# Patient Record
Sex: MALE | Race: BLACK OR AFRICAN AMERICAN | NOT HISPANIC OR LATINO | URBAN - METROPOLITAN AREA
[De-identification: names, ages, dates, MRNs, and addresses within clinical notes are randomized per-mention and may not be internally consistent; named-entity substitution may affect disease eponyms.]

---

## 2021-12-28 ENCOUNTER — HOSPITAL ENCOUNTER (INPATIENT)
Facility: OTHER | Age: 46
LOS: 1 days | Discharge: LEFT BEFORE DISCHARGE | DRG: 177 | End: 2021-12-28
Attending: EMERGENCY MEDICINE | Admitting: HOSPITALIST
Payer: OTHER GOVERNMENT

## 2021-12-28 VITALS
BODY MASS INDEX: 37.03 KG/M2 | WEIGHT: 250 LBS | DIASTOLIC BLOOD PRESSURE: 80 MMHG | OXYGEN SATURATION: 95 % | TEMPERATURE: 98 F | SYSTOLIC BLOOD PRESSURE: 140 MMHG | RESPIRATION RATE: 21 BRPM | HEIGHT: 69 IN | HEART RATE: 107 BPM

## 2021-12-28 DIAGNOSIS — J12.82 PNEUMONIA DUE TO COVID-19 VIRUS: ICD-10-CM

## 2021-12-28 DIAGNOSIS — R07.9 CHEST PAIN: ICD-10-CM

## 2021-12-28 DIAGNOSIS — U07.1 COVID-19 VIRUS DETECTED: ICD-10-CM

## 2021-12-28 DIAGNOSIS — I10 HYPERTENSION, UNSPECIFIED TYPE: ICD-10-CM

## 2021-12-28 DIAGNOSIS — U07.1 PNEUMONIA DUE TO COVID-19 VIRUS: ICD-10-CM

## 2021-12-28 DIAGNOSIS — U07.1 COVID-19: Primary | ICD-10-CM

## 2021-12-28 DIAGNOSIS — D57.00 SICKLE CELL CRISIS: ICD-10-CM

## 2021-12-28 PROBLEM — Z76.5 MALINGERING: Status: ACTIVE | Noted: 2021-12-28

## 2021-12-28 PROBLEM — I26.99 ACUTE PULMONARY EMBOLISM: Status: ACTIVE | Noted: 2021-12-28

## 2021-12-28 PROBLEM — I25.10 PRESENCE OF STENT IN CORONARY ARTERY IN PATIENT WITH CORONARY ARTERY DISEASE: Status: ACTIVE | Noted: 2021-12-28

## 2021-12-28 PROBLEM — Z79.01 ANTICOAGULANT LONG-TERM USE: Status: ACTIVE | Noted: 2021-12-28

## 2021-12-28 PROBLEM — Z95.5 PRESENCE OF STENT IN CORONARY ARTERY IN PATIENT WITH CORONARY ARTERY DISEASE: Status: ACTIVE | Noted: 2021-12-28

## 2021-12-28 PROBLEM — F19.10 SUBSTANCE ABUSE: Status: ACTIVE | Noted: 2021-12-28

## 2021-12-28 PROBLEM — Z86.711 HISTORY OF PULMONARY EMBOLISM: Status: ACTIVE | Noted: 2021-12-28

## 2021-12-28 PROBLEM — D57.1 SICKLE CELL ANEMIA: Status: ACTIVE | Noted: 2021-12-28

## 2021-12-28 LAB
ALBUMIN SERPL BCP-MCNC: 3.6 G/DL (ref 3.5–5.2)
ALLENS TEST: ABNORMAL
ALP SERPL-CCNC: 84 U/L (ref 55–135)
ALT SERPL W/O P-5'-P-CCNC: 26 U/L (ref 10–44)
ANION GAP SERPL CALC-SCNC: 15 MMOL/L (ref 8–16)
AST SERPL-CCNC: 61 U/L (ref 10–40)
BASOPHILS # BLD AUTO: 0.01 K/UL (ref 0–0.2)
BASOPHILS NFR BLD: 0.1 % (ref 0–1.9)
BILIRUB SERPL-MCNC: 0.5 MG/DL (ref 0.1–1)
BNP SERPL-MCNC: <10 PG/ML (ref 0–99)
BUN SERPL-MCNC: 11 MG/DL (ref 6–20)
CALCIUM SERPL-MCNC: 8.6 MG/DL (ref 8.7–10.5)
CHLORIDE SERPL-SCNC: 102 MMOL/L (ref 95–110)
CK SERPL-CCNC: 652 U/L (ref 20–200)
CO2 SERPL-SCNC: 20 MMOL/L (ref 23–29)
CREAT SERPL-MCNC: 1.4 MG/DL (ref 0.5–1.4)
CRP SERPL-MCNC: 30.6 MG/L (ref 0–8.2)
CTP QC/QA: YES
D DIMER PPP IA.FEU-MCNC: 0.4 MG/L FEU
DIFFERENTIAL METHOD: ABNORMAL
EOSINOPHIL # BLD AUTO: 0 K/UL (ref 0–0.5)
EOSINOPHIL NFR BLD: 0.1 % (ref 0–8)
ERYTHROCYTE [DISTWIDTH] IN BLOOD BY AUTOMATED COUNT: 14.7 % (ref 11.5–14.5)
ERYTHROCYTE [SEDIMENTATION RATE] IN BLOOD: 33 MM/HR (ref 0–10)
EST. GFR  (AFRICAN AMERICAN): >60 ML/MIN/1.73 M^2
EST. GFR  (NON AFRICAN AMERICAN): 59 ML/MIN/1.73 M^2
FERRITIN SERPL-MCNC: 132 NG/ML (ref 20–300)
GLUCOSE SERPL-MCNC: 145 MG/DL (ref 70–110)
HCO3 UR-SCNC: 27 MMOL/L (ref 24–28)
HCT VFR BLD AUTO: 42.7 % (ref 40–54)
HCT VFR BLD CALC: 41 %PCV (ref 36–54)
HGB BLD-MCNC: 13.9 G/DL (ref 14–18)
HGB BLD-MCNC: 14 G/DL
IMM GRANULOCYTES # BLD AUTO: 0.07 K/UL (ref 0–0.04)
IMM GRANULOCYTES NFR BLD AUTO: 1 % (ref 0–0.5)
INR PPP: 0.9 (ref 0.8–1.2)
LACTATE SERPL-SCNC: 1.6 MMOL/L (ref 0.5–2.2)
LDH SERPL L TO P-CCNC: 458 U/L (ref 110–260)
LYMPHOCYTES # BLD AUTO: 1.6 K/UL (ref 1–4.8)
LYMPHOCYTES NFR BLD: 24.3 % (ref 18–48)
MCH RBC QN AUTO: 28 PG (ref 27–31)
MCHC RBC AUTO-ENTMCNC: 32.6 G/DL (ref 32–36)
MCV RBC AUTO: 86 FL (ref 82–98)
MONOCYTES # BLD AUTO: 0.5 K/UL (ref 0.3–1)
MONOCYTES NFR BLD: 7.1 % (ref 4–15)
NEUTROPHILS # BLD AUTO: 4.5 K/UL (ref 1.8–7.7)
NEUTROPHILS NFR BLD: 67.4 % (ref 38–73)
NRBC BLD-RTO: 0 /100 WBC
PCO2 BLDA: 44.7 MMHG (ref 35–45)
PH SMN: 7.39 [PH] (ref 7.35–7.45)
PLATELET # BLD AUTO: 236 K/UL (ref 150–450)
PMV BLD AUTO: 10.6 FL (ref 9.2–12.9)
PO2 BLDA: 34 MMHG (ref 40–60)
POC BE: 2 MMOL/L
POC IONIZED CALCIUM: 1.12 MMOL/L (ref 1.06–1.42)
POC SATURATED O2: 64 % (ref 95–100)
POC TCO2: 28 MMOL/L (ref 24–29)
POTASSIUM BLD-SCNC: 4 MMOL/L (ref 3.5–5.1)
POTASSIUM SERPL-SCNC: 6.1 MMOL/L (ref 3.5–5.1)
PROCALCITONIN SERPL IA-MCNC: 0.04 NG/ML
PROT SERPL-MCNC: 8.9 G/DL (ref 6–8.4)
PROTHROMBIN TIME: 10.1 SEC (ref 9–12.5)
RBC # BLD AUTO: 4.97 M/UL (ref 4.6–6.2)
RETICS/RBC NFR AUTO: 1.3 % (ref 0.4–2)
SAMPLE: ABNORMAL
SARS-COV-2 RDRP RESP QL NAA+PROBE: POSITIVE
SITE: ABNORMAL
SODIUM BLD-SCNC: 141 MMOL/L (ref 136–145)
SODIUM SERPL-SCNC: 137 MMOL/L (ref 136–145)
TROPONIN I SERPL DL<=0.01 NG/ML-MCNC: 0.01 NG/ML (ref 0–0.03)
WBC # BLD AUTO: 6.72 K/UL (ref 3.9–12.7)

## 2021-12-28 PROCEDURE — 85651 RBC SED RATE NONAUTOMATED: CPT | Performed by: EMERGENCY MEDICINE

## 2021-12-28 PROCEDURE — 82550 ASSAY OF CK (CPK): CPT | Performed by: EMERGENCY MEDICINE

## 2021-12-28 PROCEDURE — 63600175 PHARM REV CODE 636 W HCPCS: Performed by: NURSE PRACTITIONER

## 2021-12-28 PROCEDURE — 93010 ELECTROCARDIOGRAM REPORT: CPT | Mod: ,,, | Performed by: INTERNAL MEDICINE

## 2021-12-28 PROCEDURE — C9399 UNCLASSIFIED DRUGS OR BIOLOG: HCPCS | Performed by: HOSPITALIST

## 2021-12-28 PROCEDURE — 25000003 PHARM REV CODE 250: Performed by: NURSE PRACTITIONER

## 2021-12-28 PROCEDURE — 96375 TX/PRO/DX INJ NEW DRUG ADDON: CPT

## 2021-12-28 PROCEDURE — 96374 THER/PROPH/DIAG INJ IV PUSH: CPT

## 2021-12-28 PROCEDURE — 86140 C-REACTIVE PROTEIN: CPT | Performed by: EMERGENCY MEDICINE

## 2021-12-28 PROCEDURE — 63600175 PHARM REV CODE 636 W HCPCS: Performed by: EMERGENCY MEDICINE

## 2021-12-28 PROCEDURE — U0002 COVID-19 LAB TEST NON-CDC: HCPCS | Performed by: EMERGENCY MEDICINE

## 2021-12-28 PROCEDURE — 99285 EMERGENCY DEPT VISIT HI MDM: CPT | Mod: 25

## 2021-12-28 PROCEDURE — 63600175 PHARM REV CODE 636 W HCPCS: Performed by: HOSPITALIST

## 2021-12-28 PROCEDURE — 96376 TX/PRO/DX INJ SAME DRUG ADON: CPT

## 2021-12-28 PROCEDURE — 82803 BLOOD GASES ANY COMBINATION: CPT

## 2021-12-28 PROCEDURE — 93010 EKG 12-LEAD: ICD-10-PCS | Mod: ,,, | Performed by: INTERNAL MEDICINE

## 2021-12-28 PROCEDURE — 11000001 HC ACUTE MED/SURG PRIVATE ROOM

## 2021-12-28 PROCEDURE — 84484 ASSAY OF TROPONIN QUANT: CPT | Mod: 91 | Performed by: EMERGENCY MEDICINE

## 2021-12-28 PROCEDURE — 83615 LACTATE (LD) (LDH) ENZYME: CPT | Performed by: EMERGENCY MEDICINE

## 2021-12-28 PROCEDURE — 96361 HYDRATE IV INFUSION ADD-ON: CPT

## 2021-12-28 PROCEDURE — 85025 COMPLETE CBC W/AUTO DIFF WBC: CPT | Performed by: EMERGENCY MEDICINE

## 2021-12-28 PROCEDURE — 85610 PROTHROMBIN TIME: CPT | Performed by: EMERGENCY MEDICINE

## 2021-12-28 PROCEDURE — 25500020 PHARM REV CODE 255: Performed by: EMERGENCY MEDICINE

## 2021-12-28 PROCEDURE — 83605 ASSAY OF LACTIC ACID: CPT | Performed by: EMERGENCY MEDICINE

## 2021-12-28 PROCEDURE — 93005 ELECTROCARDIOGRAM TRACING: CPT

## 2021-12-28 PROCEDURE — 25000003 PHARM REV CODE 250: Performed by: HOSPITALIST

## 2021-12-28 PROCEDURE — 85045 AUTOMATED RETICULOCYTE COUNT: CPT | Performed by: EMERGENCY MEDICINE

## 2021-12-28 PROCEDURE — 25000242 PHARM REV CODE 250 ALT 637 W/ HCPCS: Performed by: EMERGENCY MEDICINE

## 2021-12-28 PROCEDURE — 84145 PROCALCITONIN (PCT): CPT | Performed by: EMERGENCY MEDICINE

## 2021-12-28 PROCEDURE — 99223 PR INITIAL HOSPITAL CARE,LEVL III: ICD-10-PCS | Mod: ,,, | Performed by: HOSPITALIST

## 2021-12-28 PROCEDURE — 99900035 HC TECH TIME PER 15 MIN (STAT)

## 2021-12-28 PROCEDURE — 99223 1ST HOSP IP/OBS HIGH 75: CPT | Mod: ,,, | Performed by: HOSPITALIST

## 2021-12-28 PROCEDURE — 82728 ASSAY OF FERRITIN: CPT | Performed by: EMERGENCY MEDICINE

## 2021-12-28 PROCEDURE — 83880 ASSAY OF NATRIURETIC PEPTIDE: CPT | Performed by: EMERGENCY MEDICINE

## 2021-12-28 PROCEDURE — 80053 COMPREHEN METABOLIC PANEL: CPT | Performed by: EMERGENCY MEDICINE

## 2021-12-28 PROCEDURE — 94640 AIRWAY INHALATION TREATMENT: CPT

## 2021-12-28 PROCEDURE — 85379 FIBRIN DEGRADATION QUANT: CPT | Performed by: EMERGENCY MEDICINE

## 2021-12-28 PROCEDURE — 27000207 HC ISOLATION

## 2021-12-28 PROCEDURE — 25000003 PHARM REV CODE 250: Performed by: EMERGENCY MEDICINE

## 2021-12-28 RX ORDER — FOLIC ACID 1 MG/1
1 TABLET ORAL DAILY
Status: DISCONTINUED | OUTPATIENT
Start: 2021-12-28 | End: 2021-12-28 | Stop reason: HOSPADM

## 2021-12-28 RX ORDER — HYDROXYUREA 500 MG/1
500 CAPSULE ORAL DAILY
COMMUNITY

## 2021-12-28 RX ORDER — HYDROMORPHONE HYDROCHLORIDE 1 MG/ML
1 INJECTION, SOLUTION INTRAMUSCULAR; INTRAVENOUS; SUBCUTANEOUS ONCE
Status: COMPLETED | OUTPATIENT
Start: 2021-12-28 | End: 2021-12-28

## 2021-12-28 RX ORDER — ALBUTEROL SULFATE 2.5 MG/.5ML
2.5 SOLUTION RESPIRATORY (INHALATION)
Status: COMPLETED | OUTPATIENT
Start: 2021-12-28 | End: 2021-12-28

## 2021-12-28 RX ORDER — SODIUM CHLORIDE 0.9 % (FLUSH) 0.9 %
10 SYRINGE (ML) INJECTION
Status: DISCONTINUED | OUTPATIENT
Start: 2021-12-28 | End: 2021-12-28

## 2021-12-28 RX ORDER — ONDANSETRON 2 MG/ML
4 INJECTION INTRAMUSCULAR; INTRAVENOUS EVERY 8 HOURS PRN
Status: DISCONTINUED | OUTPATIENT
Start: 2021-12-28 | End: 2021-12-28 | Stop reason: HOSPADM

## 2021-12-28 RX ORDER — HYDROMORPHONE HYDROCHLORIDE 1 MG/ML
1 INJECTION, SOLUTION INTRAMUSCULAR; INTRAVENOUS; SUBCUTANEOUS
Status: COMPLETED | OUTPATIENT
Start: 2021-12-28 | End: 2021-12-28

## 2021-12-28 RX ORDER — CHOLECALCIFEROL (VITAMIN D3) 25 MCG
1000 TABLET ORAL DAILY
Status: DISCONTINUED | OUTPATIENT
Start: 2021-12-28 | End: 2021-12-28 | Stop reason: HOSPADM

## 2021-12-28 RX ORDER — KETOROLAC TROMETHAMINE 30 MG/ML
10 INJECTION, SOLUTION INTRAMUSCULAR; INTRAVENOUS
Status: COMPLETED | OUTPATIENT
Start: 2021-12-28 | End: 2021-12-28

## 2021-12-28 RX ORDER — DIPHENHYDRAMINE HYDROCHLORIDE 50 MG/ML
12.5 INJECTION INTRAMUSCULAR; INTRAVENOUS
Status: COMPLETED | OUTPATIENT
Start: 2021-12-28 | End: 2021-12-28

## 2021-12-28 RX ORDER — BENZONATATE 100 MG/1
200 CAPSULE ORAL
Status: COMPLETED | OUTPATIENT
Start: 2021-12-28 | End: 2021-12-28

## 2021-12-28 RX ORDER — METHYLPREDNISOLONE SOD SUCC 125 MG
125 VIAL (EA) INJECTION
Status: COMPLETED | OUTPATIENT
Start: 2021-12-28 | End: 2021-12-28

## 2021-12-28 RX ORDER — FUROSEMIDE 20 MG/1
20 TABLET ORAL DAILY
Status: DISCONTINUED | OUTPATIENT
Start: 2021-12-28 | End: 2021-12-28

## 2021-12-28 RX ORDER — NAPROXEN SODIUM 220 MG/1
324 TABLET, FILM COATED ORAL
Status: DISCONTINUED | OUTPATIENT
Start: 2021-12-28 | End: 2021-12-28

## 2021-12-28 RX ORDER — METOPROLOL SUCCINATE 50 MG/1
50 TABLET, EXTENDED RELEASE ORAL DAILY
Status: DISCONTINUED | OUTPATIENT
Start: 2021-12-28 | End: 2021-12-28 | Stop reason: HOSPADM

## 2021-12-28 RX ORDER — HYDROMORPHONE HYDROCHLORIDE 2 MG/ML
2 INJECTION, SOLUTION INTRAMUSCULAR; INTRAVENOUS; SUBCUTANEOUS
Status: COMPLETED | OUTPATIENT
Start: 2021-12-28 | End: 2021-12-28

## 2021-12-28 RX ORDER — SODIUM CHLORIDE 9 MG/ML
INJECTION, SOLUTION INTRAVENOUS
Status: DISCONTINUED | OUTPATIENT
Start: 2021-12-28 | End: 2021-12-28

## 2021-12-28 RX ORDER — ALBUTEROL SULFATE 90 UG/1
2 AEROSOL, METERED RESPIRATORY (INHALATION) EVERY 6 HOURS
Status: DISCONTINUED | OUTPATIENT
Start: 2021-12-28 | End: 2021-12-28 | Stop reason: HOSPADM

## 2021-12-28 RX ORDER — FOLIC ACID 1 MG/1
1 TABLET ORAL DAILY
COMMUNITY

## 2021-12-28 RX ORDER — HYDROXYUREA 500 MG/1
500 CAPSULE ORAL DAILY
Status: DISCONTINUED | OUTPATIENT
Start: 2021-12-28 | End: 2021-12-28 | Stop reason: HOSPADM

## 2021-12-28 RX ORDER — SODIUM CHLORIDE 9 MG/ML
INJECTION, SOLUTION INTRAVENOUS ONCE
Status: DISCONTINUED | OUTPATIENT
Start: 2021-12-28 | End: 2021-12-28

## 2021-12-28 RX ORDER — ENOXAPARIN SODIUM 100 MG/ML
1 INJECTION SUBCUTANEOUS 2 TIMES DAILY
Status: DISCONTINUED | OUTPATIENT
Start: 2021-12-28 | End: 2021-12-28

## 2021-12-28 RX ORDER — ACETAMINOPHEN 500 MG
1000 TABLET ORAL
Status: COMPLETED | OUTPATIENT
Start: 2021-12-28 | End: 2021-12-28

## 2021-12-28 RX ORDER — CLOPIDOGREL BISULFATE 75 MG/1
75 TABLET ORAL DAILY
Status: DISCONTINUED | OUTPATIENT
Start: 2021-12-28 | End: 2021-12-28 | Stop reason: HOSPADM

## 2021-12-28 RX ORDER — ASCORBIC ACID 500 MG
500 TABLET ORAL 2 TIMES DAILY
Status: DISCONTINUED | OUTPATIENT
Start: 2021-12-28 | End: 2021-12-28

## 2021-12-28 RX ORDER — ENOXAPARIN SODIUM 100 MG/ML
1 INJECTION SUBCUTANEOUS
Status: DISCONTINUED | OUTPATIENT
Start: 2021-12-28 | End: 2021-12-28 | Stop reason: HOSPADM

## 2021-12-28 RX ORDER — FUROSEMIDE 20 MG/1
20 TABLET ORAL DAILY
COMMUNITY

## 2021-12-28 RX ORDER — WARFARIN 2 MG/1
2 TABLET ORAL DAILY
COMMUNITY

## 2021-12-28 RX ORDER — METOPROLOL SUCCINATE 50 MG/1
50 TABLET, EXTENDED RELEASE ORAL DAILY
COMMUNITY

## 2021-12-28 RX ORDER — ASCORBIC ACID 500 MG
500 TABLET ORAL 2 TIMES DAILY
Status: DISCONTINUED | OUTPATIENT
Start: 2021-12-28 | End: 2021-12-28 | Stop reason: HOSPADM

## 2021-12-28 RX ORDER — SODIUM CHLORIDE 9 MG/ML
INJECTION, SOLUTION INTRAVENOUS
Status: COMPLETED | OUTPATIENT
Start: 2021-12-28 | End: 2021-12-28

## 2021-12-28 RX ORDER — CLOPIDOGREL BISULFATE 75 MG/1
75 TABLET ORAL DAILY
COMMUNITY

## 2021-12-28 RX ORDER — DIPHENHYDRAMINE HYDROCHLORIDE 50 MG/ML
25 INJECTION INTRAMUSCULAR; INTRAVENOUS
Status: DISCONTINUED | OUTPATIENT
Start: 2021-12-28 | End: 2021-12-28

## 2021-12-28 RX ADMIN — HYDROMORPHONE HYDROCHLORIDE 2 MG: 2 INJECTION INTRAMUSCULAR; INTRAVENOUS; SUBCUTANEOUS at 05:12

## 2021-12-28 RX ADMIN — SODIUM CHLORIDE: 0.9 INJECTION, SOLUTION INTRAVENOUS at 05:12

## 2021-12-28 RX ADMIN — REMDESIVIR 200 MG: 100 INJECTION, POWDER, LYOPHILIZED, FOR SOLUTION INTRAVENOUS at 01:12

## 2021-12-28 RX ADMIN — HYDROMORPHONE HYDROCHLORIDE 1 MG: 1 INJECTION, SOLUTION INTRAMUSCULAR; INTRAVENOUS; SUBCUTANEOUS at 03:12

## 2021-12-28 RX ADMIN — Medication 1000 UNITS: at 01:12

## 2021-12-28 RX ADMIN — KETOROLAC TROMETHAMINE 10 MG: 30 INJECTION, SOLUTION INTRAMUSCULAR at 05:12

## 2021-12-28 RX ADMIN — ALBUTEROL SULFATE 2.5 MG: 2.5 SOLUTION RESPIRATORY (INHALATION) at 06:12

## 2021-12-28 RX ADMIN — FOLIC ACID 1 MG: 1 TABLET ORAL at 01:12

## 2021-12-28 RX ADMIN — DEXAMETHASONE 6 MG: 4 TABLET ORAL at 01:12

## 2021-12-28 RX ADMIN — METHYLPREDNISOLONE SODIUM SUCCINATE 125 MG: 125 INJECTION, POWDER, FOR SOLUTION INTRAMUSCULAR; INTRAVENOUS at 08:12

## 2021-12-28 RX ADMIN — HYDROMORPHONE HYDROCHLORIDE 1 MG: 1 INJECTION, SOLUTION INTRAMUSCULAR; INTRAVENOUS; SUBCUTANEOUS at 01:12

## 2021-12-28 RX ADMIN — DIPHENHYDRAMINE HYDROCHLORIDE 12.5 MG: 50 INJECTION INTRAMUSCULAR; INTRAVENOUS at 08:12

## 2021-12-28 RX ADMIN — BENZONATATE 200 MG: 100 CAPSULE ORAL at 05:12

## 2021-12-28 RX ADMIN — ENOXAPARIN SODIUM 110 MG: 100 INJECTION, SOLUTION INTRAVENOUS; SUBCUTANEOUS at 01:12

## 2021-12-28 RX ADMIN — ACETAMINOPHEN 1000 MG: 500 TABLET, FILM COATED ORAL at 05:12

## 2021-12-28 RX ADMIN — IOHEXOL 100 ML: 350 INJECTION, SOLUTION INTRAVENOUS at 08:12

## 2021-12-28 RX ADMIN — HYDROMORPHONE HYDROCHLORIDE 1 MG: 1 INJECTION, SOLUTION INTRAMUSCULAR; INTRAVENOUS; SUBCUTANEOUS at 08:12

## 2021-12-28 RX ADMIN — DIPHENHYDRAMINE HYDROCHLORIDE 12.5 MG: 50 INJECTION INTRAMUSCULAR; INTRAVENOUS at 07:12

## 2021-12-28 NOTE — ED NOTES
Pt heart rate 150, temp 103, and pt coughing with wheezing. MD notified, states will order medications

## 2021-12-28 NOTE — ED PROVIDER NOTES
Encounter Date: 12/28/2021    SCRIBE #1 NOTE: I, Mickey Allan, am scribing for, and in the presence of,  Liza López MD. I have scribed the following portions of the note - Other sections scribed: HPI, ROS, PE.       History     Chief Complaint   Patient presents with    Chest Pain     And joint pain.      Time seen by provider: 3:15 AM    This is a 46 y.o. male who presents with complaint of central chest pain radiating into the left jaw and arm for 30 minutes. Patient states this is accompanied by pain to all of his joints. He recalls this as familiar to his prior episodes of acute chest with his sickle cell anemia, most recently eight months ago. Patient denies any chills, fever, or shortness of breath. He has not taken any medications for his pain and does not have any prescribed medications. Fully vaccinated against COVID-19 as of one month ago. Known allergies to aspirin, iodine dye, and Norco.    The history is provided by the patient.     Review of patient's allergies indicates:   Allergen Reactions    Iodine and iodide containing products     Norco [hydrocodone-acetaminophen]     Opioids - morphine analogues      Past Medical History:   Diagnosis Date    Acute pulmonary embolism 12/28/2021    Anticoagulant long-term use     Anticoagulant long-term use 12/28/2021    Coronary artery disease     History of pulmonary embolism 12/28/2021    Hypertension      Past Surgical History:   Procedure Laterality Date    CARDIAC SURGERY       Family History   Problem Relation Age of Onset    Heart disease Mother     Sickle cell anemia Mother     Heart disease Father      Social History     Tobacco Use    Smoking status: Never Smoker   Substance Use Topics    Alcohol use: Never    Drug use: Never     Review of Systems   Constitutional: Negative for chills and fever.   HENT: Negative for sore throat.         Positive for jaw pain   Respiratory: Negative for shortness of breath.    Cardiovascular: Positive  for chest pain.   Gastrointestinal: Negative for nausea.   Genitourinary: Negative for dysuria.   Musculoskeletal: Positive for arthralgias and myalgias. Negative for back pain.   Skin: Negative for rash.   Neurological: Negative for weakness.   Hematological: Does not bruise/bleed easily.       Physical Exam     Initial Vitals [12/28/21 0124]   BP Pulse Resp Temp SpO2   135/84 (!) 120 16 99.7 °F (37.6 °C) 95 %      MAP       --         Physical Exam    Constitutional: He appears well-developed and well-nourished. He does not have a sickly appearance. No distress.   HENT:   Head: Normocephalic and atraumatic.   Right Ear: External ear normal.   Left Ear: External ear normal.   Eyes: Conjunctivae, EOM and lids are normal. Right eye exhibits no discharge. Left eye exhibits no discharge. Right conjunctiva is not injected. Right conjunctiva has no hemorrhage. Left conjunctiva is not injected. Left conjunctiva has no hemorrhage. No scleral icterus.   Neck: Phonation normal. No stridor present. No tracheal deviation present.   Normal range of motion.  Cardiovascular: Normal rate, regular rhythm and normal heart sounds. Exam reveals no friction rub.    No murmur heard.  Pulses:       Radial pulses are 2+ on the right side and 2+ on the left side.        Dorsalis pedis pulses are 2+ on the right side and 2+ on the left side.   Pulmonary/Chest: Breath sounds normal. No respiratory distress. He has no wheezes. He has no rales. He exhibits no tenderness.   Musculoskeletal:      Cervical back: Normal range of motion.     Neurological: He is alert and oriented to person, place, and time. He has normal strength. GCS eye subscore is 4. GCS verbal subscore is 5. GCS motor subscore is 6.   Skin: Skin is warm.   Psychiatric: He has a normal mood and affect. His speech is normal and behavior is normal. Judgment and thought content normal. Cognition and memory are normal.         ED Course   Procedures  Labs Reviewed   CBC W/ AUTO  DIFFERENTIAL - Abnormal; Notable for the following components:       Result Value    Hemoglobin 13.9 (*)     RDW 14.7 (*)     Immature Granulocytes 1.0 (*)     Immature Grans (Abs) 0.07 (*)     All other components within normal limits   COMPREHENSIVE METABOLIC PANEL - Abnormal; Notable for the following components:    Potassium 6.1 (*)     CO2 20 (*)     Glucose 145 (*)     Calcium 8.6 (*)     Total Protein 8.9 (*)     AST 61 (*)     eGFR if non  59 (*)     All other components within normal limits   C-REACTIVE PROTEIN - Abnormal; Notable for the following components:    CRP 30.6 (*)     All other components within normal limits   LACTATE DEHYDROGENASE - Abnormal; Notable for the following components:     (*)     All other components within normal limits   CK - Abnormal; Notable for the following components:     (*)     All other components within normal limits   SEDIMENTATION RATE - Abnormal; Notable for the following components:    Sed Rate 33 (*)     All other components within normal limits   SARS-COV-2 RDRP GENE - Abnormal; Notable for the following components:    POC Rapid COVID Positive (*)     All other components within normal limits   ISTAT PROCEDURE - Abnormal; Notable for the following components:    POC PO2 34 (*)     POC SATURATED O2 64 (*)     All other components within normal limits   B-TYPE NATRIURETIC PEPTIDE   TROPONIN I   PROTIME-INR   TROPONIN I   RETICULOCYTES   FERRITIN   LACTIC ACID, PLASMA   PROCALCITONIN   D DIMER, QUANTITATIVE   TROPONIN I        ECG Results          EKG 12-lead (In process)  Result time 12/28/21 06:25:33    In process by Interface, Lab In [facility] (12/28/21 06:25:33)                 Narrative:    Test Reason : R07.9,    Vent. Rate : 117 BPM     Atrial Rate : 117 BPM     P-R Int : 126 ms          QRS Dur : 082 ms      QT Int : 328 ms       P-R-T Axes : 053 -44 045 degrees     QTc Int : 457 ms    Sinus tachycardia  Possible Left atrial  enlargement  Left axis deviation  LVH  Abnormal ECG      Referred By: [email or web address]   SELF           Confirmed By:                             Imaging Results          CTA Chest Non-Coronary (PE Study) (Final result)  Result time 12/28/21 09:04:39    Final result by Zana Booker MD (12/28/21 09:04:39)                 Impression:      1. Essentially nondiagnostic examination due to suboptimal contrast bolus timing and extensive respiratory motion.  No definite large central/saddle type embolus is identified.  2. Possible borderline enlargement of the main pulmonary artery to 3.1 cm transverse dimension, nonspecific finding which may be seen in the setting pulmonary arterial hypertension.  3. Bilateral ground-glass and solid nodules and airspace consolidation in a peribronchovascular and subpleural distribution would be compatible with pulmonary manifestation of COVID-19 infection in the given clinical context.  Additional infectious and noninfectious inflammatory etiologies could be considered.  4. Additional details as provided in the body of report.      Electronically signed by: Zana Booker  Date:    12/28/2021  Time:    09:04             Narrative:    EXAMINATION:  CTA CHEST NON CORONARY    CLINICAL HISTORY:  Pulmonary embolism (PE) suspected, high prob;    TECHNIQUE:  Low dose axial images, sagittal and coronal reformations were obtained from the thoracic inlet to the lung bases following the IV administration of 100 mL of Omnipaque 350.  Contrast timing was optimized to evaluate the pulmonary arteries.  MIP images were performed.    COMPARISON:  Chest radiograph performed 12/28/2021    FINDINGS:  Exam quality: Essentially nondiagnostic examination due to suboptimal contrast bolus timing and extensive respiratory motion.    Pulmonary embolism: No definite large central/saddle-type embolus identified    Central pulmonary arteries: Possible borderline enlargement of the main pulmonary artery measuring up to  3.1 cm transverse dimension.  Nonspecific finding which may be seen in the setting of pulmonary arterial hypertension.    Aorta: Anatomic variant short segment common origin of the brachiocephalic and left common carotid arteries.  Normal caliber of the visualized aorta.    Heart: No right heart strain. Normal size.    Coronary arteries: No calcifications.    Pericardium: Normal. No effusion, thickening, or calcification.    Support tubes and lines: None.    Base of neck/thyroid: Normal.    Lymph nodes: No supraclavicular, axillary, internal mammary, mediastinal, or hilar adenopathy.    Esophagus: Normal.    Pleura: No effusion, thickening, or calcification.    Upper abdomen: Unremarkable    Body wall: Unremarkable    Airways: Central airways appear patent.    Lungs: Patchy bilateral ground-glass and solid nodules are noted predominantly in a peribronchovascular and subpleural distribution.  Dependent atelectasis is additionally noted.    Bones: No focal lesion.                               X-Ray Chest PA And Lateral (Final result)  Result time 12/28/21 02:10:26    Final result by Deon Walden MD (12/28/21 02:10:26)                 Impression:      Suspected basilar infiltrates, left greater than right superimposed on diminished depth of inspiration, as discussed above.    Prominence of the cardiomediastinal silhouette is thought likely predominantly due to diminished depth of inspiration and mild rotation although mild baseline cardiac prominence is a consideration.      Electronically signed by: Deon Walden  Date:    12/28/2021  Time:    02:10             Narrative:    EXAMINATION:  XR CHEST PA AND LATERAL    CLINICAL HISTORY:  Chest Pain;    TECHNIQUE:  PA and lateral views of the chest were performed.    COMPARISON:  None    FINDINGS:  Two views of the chest are submitted.  There is diminished depth of inspiration and minimal rotation, this exaggerates the appearance of the cardiomediastinal  silhouette, there may be mild cardiac prominence although difficult to definitively determine due to the aforementioned.  There is mild motion artifact on the lateral view.    There is accentuation of pulmonary bronchovascular markings consistent with diminished depth of inspiration.  There is appearance thought to represent superimposed basilar infiltrates, left greater than right.  There is no evidence for pleural effusion and there is no evidence for pneumothorax.                                 Medications   HYDROmorphone injection 1 mg (1 mg Intravenous Given 12/28/21 0348)   0.9%  NaCl infusion ( Intravenous Stopped 12/28/21 0739)   benzonatate capsule 200 mg (200 mg Oral Given 12/28/21 0530)   acetaminophen tablet 1,000 mg (1,000 mg Oral Given 12/28/21 0530)   ketorolac injection 9.999 mg (9.999 mg Intravenous Given 12/28/21 0534)   HYDROmorphone (PF) injection 2 mg (2 mg Intravenous Given 12/28/21 0555)   albuterol sulfate nebulizer solution 2.5 mg (2.5 mg Nebulization Given 12/28/21 0632)   diphenhydrAMINE injection 12.5 mg (12.5 mg Intravenous Given 12/28/21 0705)   HYDROmorphone injection 1 mg (1 mg Intravenous Given 12/28/21 0816)   methylPREDNISolone sodium succinate injection 125 mg (125 mg Intravenous Given 12/28/21 0816)   diphenhydrAMINE injection 12.5 mg (12.5 mg Intravenous Given 12/28/21 0816)   iohexoL (OMNIPAQUE 350) injection 100 mL (100 mLs Intravenous Given 12/28/21 0845)   remdesivir 200 mg in sodium chloride 0.9% 250 mL infusion (200 mg Intravenous New Bag 12/28/21 1354)   HYDROmorphone injection 1 mg (1 mg Intravenous Given 12/28/21 1337)     Medical Decision Making:   History:   Old Medical Records: I decided to obtain old medical records.  Clinical Tests:   Lab Tests: Ordered and Reviewed  Radiological Study: Ordered and Reviewed  Medical Tests: Ordered and Reviewed          Scribe Attestation:   Scribe #1: I performed the above scribed service and the documentation accurately describes  the services I performed. I attest to the accuracy of the note.        ED Course as of 12/28/21 2154   Tue Dec 28, 2021   0812 Accepted care patient from .  Patient has a history contrast allergy.  Discussed with him.  Patient states that he can get CT IV contrast if he has premedication.  Patient also requested a 2nd round of pain medication. [MA]   1005 Case discussed with Dr. Hart, will admit to Dr. Dove [MA]      ED Course User Index  [MA] Rj Mcgovern MD           Physician Attestation for Scribe: I, Liza López  , reviewed documentation as scribed in my presence, which is both accurate and complete.    Clinical Impression:   Final diagnoses:  [R07.9] Chest pain  [U07.1] COVID-19 (Primary)  [D57.00] Sickle cell crisis          ED Disposition Condition    Admit               Liza López MD  12/28/21 2150
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
## 2021-12-28 NOTE — ED NOTES
Upon entering room RN noticed pt wheezing, breathing hard, and coughing. Supplemental O2 turned on 1L. MD notified, states will order breathing treatment

## 2021-12-28 NOTE — ED NOTES
Pt calling out for nurse. Pt states he is still hurting. I informed pt that I gave his IV pain meds less than 15 minutes ago and it is not time for another dose but I will let MD know about continued pain. Pt verbalized understanding. Pt connected to monitor. O2 sats 96% on RA. Call light within reach
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
## 2021-12-28 NOTE — ASSESSMENT & PLAN NOTE
Pt taking plavix 75 mg daily. He is allergic to aspirin.  Stent placement was 3 months ago in [place].  Will request records from Dr. Mac. Pt does not know the hospital at which he was treated.
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
## 2021-12-28 NOTE — ASSESSMENT & PLAN NOTE
Pt has been on 2 mg coumadin for 3 months. Hemoglobin and hematocrit are stable. Will monitor for signs of bleeding. Coumadin on hold and patient taking full dose enoxaparin while inpatient.
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
## 2021-12-28 NOTE — SUBJECTIVE & OBJECTIVE
Past Medical History:   Diagnosis Date    Acute pulmonary embolism 12/28/2021    Anticoagulant long-term use     Anticoagulant long-term use 12/28/2021    Coronary artery disease     History of pulmonary embolism 12/28/2021    Hypertension        Past Surgical History:   Procedure Laterality Date    CARDIAC SURGERY         Review of patient's allergies indicates:   Allergen Reactions    Iodine and iodide containing products     Norco [hydrocodone-acetaminophen]     Opioids - morphine analogues        No current facility-administered medications on file prior to encounter.     Current Outpatient Medications on File Prior to Encounter   Medication Sig    clopidogreL (PLAVIX) 75 mg tablet Take 75 mg by mouth once daily.    folic acid (FOLVITE) 1 MG tablet Take 1 mg by mouth once daily.    furosemide (LASIX) 20 MG tablet Take 20 mg by mouth once daily.    hydroxyurea (HYDREA) 500 mg Cap Take 500 mg by mouth once daily.    metoprolol succinate (TOPROL-XL) 50 MG 24 hr tablet Take 50 mg by mouth once daily.    warfarin (COUMADIN) 2 MG tablet Take 2 mg by mouth once daily.     Family History     Problem Relation (Age of Onset)    Heart disease Mother, Father    Sickle cell anemia Mother        Tobacco Use    Smoking status: Never Smoker    Smokeless tobacco: Not on file   Substance and Sexual Activity    Alcohol use: Never    Drug use: Never    Sexual activity: Not on file     Review of Systems   Constitutional: Positive for fever. Negative for activity change.   HENT: Negative for congestion, rhinorrhea and trouble swallowing.    Eyes: Negative for photophobia and visual disturbance.   Respiratory: Negative for cough, chest tightness and shortness of breath.    Cardiovascular: Positive for chest pain (resolved).   Gastrointestinal: Negative for abdominal pain, constipation, diarrhea and nausea.   Endocrine: Negative for cold intolerance.   Genitourinary: Negative for difficulty urinating, dysuria and  hematuria.   Musculoskeletal: Positive for arthralgias. Negative for joint swelling.   Skin: Negative for rash and wound.   Neurological: Negative for dizziness, numbness and headaches.   Psychiatric/Behavioral: Negative for confusion.     Objective:     Vital Signs (Most Recent):  Temp: 99.2 °F (37.3 °C) (12/28/21 0708)  Pulse: 105 (12/28/21 1110)  Resp: 20 (12/28/21 0816)  BP: 105/61 (12/28/21 1110)  SpO2: (!) 93 % (12/28/21 1109) Vital Signs (24h Range):  Temp:  [99.2 °F (37.3 °C)-103 °F (39.4 °C)] 99.2 °F (37.3 °C)  Pulse:  [105-133] 105  Resp:  [16-20] 20  SpO2:  [93 %-97 %] 93 %  BP: (105-146)/() 105/61     Weight: 113.4 kg (250 lb)  Body mass index is 36.92 kg/m².    Physical Exam  Constitutional:       Appearance: He is obese.   HENT:      Head: Normocephalic and atraumatic.      Nose: No congestion.      Mouth/Throat:      Mouth: Mucous membranes are moist.      Pharynx: Oropharynx is clear.   Eyes:      Extraocular Movements: Extraocular movements intact.      Pupils: Pupils are equal, round, and reactive to light.   Cardiovascular:      Rate and Rhythm: Regular rhythm. Tachycardia present.      Heart sounds: Normal heart sounds. No murmur heard.      Pulmonary:      Effort: Pulmonary effort is normal. No respiratory distress.      Breath sounds: Normal breath sounds. No wheezing.   Chest:      Chest wall: No tenderness.   Abdominal:      General: Bowel sounds are normal. There is no distension.      Palpations: Abdomen is soft.      Tenderness: There is no abdominal tenderness.   Musculoskeletal:         General: Tenderness present. No swelling.      Cervical back: Normal range of motion and neck supple.   Skin:     General: Skin is warm.      Findings: No rash.   Neurological:      General: No focal deficit present.      Mental Status: He is alert and oriented to person, place, and time.   Psychiatric:         Mood and Affect: Mood normal.         Behavior: Behavior normal.           CRANIAL NERVES      CN III, IV, VI   Pupils are equal, round, and reactive to light.       Significant Labs: All pertinent labs within the past 24 hours have been reviewed.    Significant Imaging: I have reviewed all pertinent imaging results/findings within the past 24 hours.

## 2021-12-28 NOTE — ASSESSMENT & PLAN NOTE
Patient test positive for COVID 19 in ED. Pt has radiographic evidence of pneumonia.  His oxygenation saturation is less than 94% on room air. Pt reports having two doses of the moderna vaccine. He is a High Risk COVID 19 patient and will receive the appropriate therapy. Pt receiving remdesivir, steroids, multivitamins, albuterol treatment, and vitamin supplementation.

## 2021-12-28 NOTE — ASSESSMENT & PLAN NOTE
Pt takes hydroxyurea as part of his sickle cell disease pharmacological regimen. He has had previous crisis in the past. He does report this event is similar but not exact. Hemoglobin 13.9/hematocrit 42.7 at this time. Serum lactate dehydrogenase 458. Pt reports 9/10 pain described as pressure in all of his joints.Will monitor volume status and control pain. Will request records from [facility] in [place]. Pt reports hematologist of Dr. Guan.

## 2021-12-28 NOTE — ASSESSMENT & PLAN NOTE
19:30- Received oncoming report from [address] 
 
23:30: 
 
General Surgery End of Shift Nursing Note Bedside shift change report given to Yolis Mahmood (oncoming nurse) by Saige Vasquez (offgoing nurse). Report included the following information SBAR, Kardex, Intake/Output, MAR and Recent Results. Shift worked:   7p-11p Summary of shift:    Pt up to bathroom. No complaints of pain. Issues for physician to address:   Discharge? Number times ambulated in hallway past shift: 0 Number of times OOB to chair past shift: 1 GI: 
 
Current diet:  DIET NUTRITIONAL SUPPLEMENTS No; Breakfast, Dinner; Ensure Clear DIET GI LITE (POST SURGICAL) Tolerating current diet: YES Last Bowel Movement: 5/8/19 Respiratory: 
 
Incentive Spirometer at bedside: YES Patient instructed on use: YES Patient Safety: 
 
Falls Score: 4 Bed Alarm On? Yes Sitter?  No 
 
Sarah Ayoub RN 
 
 
 
 
 Pt reported sharp chest pain that radiated to his left jaw and left arm and lasted 30 minutes.  He is at risk for myocardial infarction and has cardiovascular disease with cardiac stents placed 3 months ago per his report. Pt reports compliance with his coumadin and plavix. Pt had an EKG in the emergency department that was negative for ST elevations/depressions. His troponin is negative at this time. Chest radiograph was negative for cardiac abnormalities. CTA chest report states no central or saddle embolus identified. CTA chest did identify possible borderline enlargement of main pulmonary artery, patchy, ground glass and solid nodules and dependent atelectasis. Will repeat troponin. EKG prn ordered for any new chest pain. Cannot rule out vaso-cclussive phenomena s/t to sickle cell disease vs symptomatic COVID.

## 2025-01-27 NOTE — ASSESSMENT & PLAN NOTE
Blood pressures are stable at this time. He is tachycardic. Will continue home antihypertensives.    The ECG revealed a paced rhythm. The patient has a permanent pacemaker. The ECG rate was 97 bpm.